# Patient Record
Sex: FEMALE | Race: WHITE | NOT HISPANIC OR LATINO | Employment: UNEMPLOYED | URBAN - METROPOLITAN AREA
[De-identification: names, ages, dates, MRNs, and addresses within clinical notes are randomized per-mention and may not be internally consistent; named-entity substitution may affect disease eponyms.]

---

## 2017-03-08 ENCOUNTER — APPOINTMENT (OUTPATIENT)
Dept: GENERAL RADIOLOGY | Facility: HOSPITAL | Age: 37
End: 2017-03-08

## 2017-03-08 ENCOUNTER — HOSPITAL ENCOUNTER (EMERGENCY)
Facility: HOSPITAL | Age: 37
Discharge: HOME | End: 2017-03-08
Attending: EMERGENCY MEDICINE | Admitting: EMERGENCY MEDICINE

## 2017-03-08 VITALS
HEART RATE: 119 BPM | DIASTOLIC BLOOD PRESSURE: 85 MMHG | OXYGEN SATURATION: 99 % | SYSTOLIC BLOOD PRESSURE: 132 MMHG | WEIGHT: 110 LBS | HEIGHT: 60 IN | TEMPERATURE: 97 F | BODY MASS INDEX: 21.6 KG/M2 | RESPIRATION RATE: 14 BRPM

## 2017-03-08 DIAGNOSIS — R61 DIAPHORESIS: Primary | ICD-10-CM

## 2017-03-08 LAB
ALBUMIN SERPL-MCNC: 4.7 G/DL (ref 3.5–5.2)
ALBUMIN/GLOB SERPL: 1.8 G/DL
ALP SERPL-CCNC: 60 U/L (ref 39–117)
ALT SERPL W P-5'-P-CCNC: 211 U/L (ref 1–33)
ANION GAP SERPL CALCULATED.3IONS-SCNC: 22.6 MMOL/L
AST SERPL-CCNC: 143 U/L (ref 1–32)
BASOPHILS # BLD AUTO: 0.02 10*3/MM3 (ref 0–0.2)
BASOPHILS NFR BLD AUTO: 0.2 % (ref 0–1.5)
BILIRUB SERPL-MCNC: 0.6 MG/DL (ref 0.1–1.2)
BILIRUB UR QL STRIP: NEGATIVE
BUN BLD-MCNC: 19 MG/DL (ref 6–20)
BUN/CREAT SERPL: 19.6 (ref 7–25)
CALCIUM SPEC-SCNC: 9.8 MG/DL (ref 8.6–10.5)
CHLORIDE SERPL-SCNC: 97 MMOL/L (ref 98–107)
CLARITY UR: CLEAR
CO2 SERPL-SCNC: 15.4 MMOL/L (ref 22–29)
COLOR UR: ABNORMAL
CREAT BLD-MCNC: 0.97 MG/DL (ref 0.57–1)
D-LACTATE SERPL-SCNC: 1.5 MMOL/L (ref 0.5–2)
DEPRECATED RDW RBC AUTO: 48 FL (ref 37–54)
EOSINOPHIL # BLD AUTO: 0.02 10*3/MM3 (ref 0–0.7)
EOSINOPHIL NFR BLD AUTO: 0.2 % (ref 0.3–6.2)
ERYTHROCYTE [DISTWIDTH] IN BLOOD BY AUTOMATED COUNT: 15.1 % (ref 11.7–13)
FLUAV AG NPH QL: NEGATIVE
FLUBV AG NPH QL IA: NEGATIVE
GFR SERPL CREATININE-BSD FRML MDRD: 65 ML/MIN/1.73
GLOBULIN UR ELPH-MCNC: 2.6 GM/DL
GLUCOSE BLD-MCNC: 75 MG/DL (ref 65–99)
GLUCOSE UR STRIP-MCNC: NEGATIVE MG/DL
HCT VFR BLD AUTO: 44 % (ref 35.6–45.5)
HGB BLD-MCNC: 15.2 G/DL (ref 11.9–15.5)
HGB UR QL STRIP.AUTO: NEGATIVE
IMM GRANULOCYTES # BLD: 0.03 10*3/MM3 (ref 0–0.03)
IMM GRANULOCYTES NFR BLD: 0.3 % (ref 0–0.5)
KETONES UR QL STRIP: ABNORMAL
LEUKOCYTE ESTERASE UR QL STRIP.AUTO: NEGATIVE
LIPASE SERPL-CCNC: 27 U/L (ref 13–60)
LYMPHOCYTES # BLD AUTO: 1.33 10*3/MM3 (ref 0.9–4.8)
LYMPHOCYTES NFR BLD AUTO: 13.6 % (ref 19.6–45.3)
MCH RBC QN AUTO: 30.3 PG (ref 26.9–32)
MCHC RBC AUTO-ENTMCNC: 34.5 G/DL (ref 32.4–36.3)
MCV RBC AUTO: 87.6 FL (ref 80.5–98.2)
MONOCYTES # BLD AUTO: 0.69 10*3/MM3 (ref 0.2–1.2)
MONOCYTES NFR BLD AUTO: 7.1 % (ref 5–12)
NEUTROPHILS # BLD AUTO: 7.66 10*3/MM3 (ref 1.9–8.1)
NEUTROPHILS NFR BLD AUTO: 78.6 % (ref 42.7–76)
NITRITE UR QL STRIP: NEGATIVE
PH UR STRIP.AUTO: 5 [PH] (ref 5–8)
PLATELET # BLD AUTO: 344 10*3/MM3 (ref 140–500)
PMV BLD AUTO: 10.5 FL (ref 6–12)
POTASSIUM BLD-SCNC: 4.6 MMOL/L (ref 3.5–5.2)
PROCALCITONIN SERPL-MCNC: 0.06 NG/ML (ref 0.1–0.25)
PROT SERPL-MCNC: 7.3 G/DL (ref 6–8.5)
PROT UR QL STRIP: NEGATIVE
RBC # BLD AUTO: 5.02 10*6/MM3 (ref 3.9–5.2)
SODIUM BLD-SCNC: 135 MMOL/L (ref 136–145)
SP GR UR STRIP: 1.03 (ref 1–1.03)
UROBILINOGEN UR QL STRIP: ABNORMAL
WBC NRBC COR # BLD: 9.75 10*3/MM3 (ref 4.5–10.7)

## 2017-03-08 PROCEDURE — 99284 EMERGENCY DEPT VISIT MOD MDM: CPT

## 2017-03-08 PROCEDURE — 87804 INFLUENZA ASSAY W/OPTIC: CPT | Performed by: PHYSICIAN ASSISTANT

## 2017-03-08 PROCEDURE — 80053 COMPREHEN METABOLIC PANEL: CPT | Performed by: PHYSICIAN ASSISTANT

## 2017-03-08 PROCEDURE — 83605 ASSAY OF LACTIC ACID: CPT | Performed by: PHYSICIAN ASSISTANT

## 2017-03-08 PROCEDURE — 83690 ASSAY OF LIPASE: CPT | Performed by: PHYSICIAN ASSISTANT

## 2017-03-08 PROCEDURE — 96360 HYDRATION IV INFUSION INIT: CPT

## 2017-03-08 PROCEDURE — 85025 COMPLETE CBC W/AUTO DIFF WBC: CPT | Performed by: PHYSICIAN ASSISTANT

## 2017-03-08 PROCEDURE — 84145 PROCALCITONIN (PCT): CPT | Performed by: PHYSICIAN ASSISTANT

## 2017-03-08 PROCEDURE — 71010 HC CHEST PA OR AP: CPT

## 2017-03-08 PROCEDURE — 96361 HYDRATE IV INFUSION ADD-ON: CPT

## 2017-03-08 PROCEDURE — 81003 URINALYSIS AUTO W/O SCOPE: CPT | Performed by: PHYSICIAN ASSISTANT

## 2017-03-08 RX ORDER — LORAZEPAM 0.5 MG/1
0.5 TABLET ORAL EVERY 8 HOURS PRN
COMMUNITY

## 2017-03-08 RX ORDER — DORZOLAMIDE HYDROCHLORIDE AND TIMOLOL MALEATE 20; 5 MG/ML; MG/ML
1 SOLUTION/ DROPS OPHTHALMIC 2 TIMES DAILY
COMMUNITY

## 2017-03-08 RX ORDER — MELATONIN
2000 DAILY
COMMUNITY

## 2017-03-08 RX ORDER — RISEDRONATE SODIUM 35 MG/1
35 TABLET, FILM COATED ORAL
COMMUNITY

## 2017-03-08 RX ORDER — FEXOFENADINE HCL 180 MG/1
180 TABLET ORAL DAILY
COMMUNITY

## 2017-03-08 RX ORDER — GABAPENTIN 600 MG/1
600 TABLET ORAL 2 TIMES DAILY
COMMUNITY

## 2017-03-08 RX ORDER — LEVONORGESTREL AND ETHINYL ESTRADIOL 0.15-0.03
1 KIT ORAL DAILY
COMMUNITY

## 2017-03-08 RX ORDER — TIMOLOL MALEATE 5 MG/ML
1 SOLUTION/ DROPS OPHTHALMIC 2 TIMES DAILY
COMMUNITY

## 2017-03-08 RX ORDER — LATANOPROST 50 UG/ML
1 SOLUTION/ DROPS OPHTHALMIC NIGHTLY
COMMUNITY

## 2017-03-08 RX ORDER — SODIUM CHLORIDE 0.9 % (FLUSH) 0.9 %
10 SYRINGE (ML) INJECTION AS NEEDED
Status: DISCONTINUED | OUTPATIENT
Start: 2017-03-08 | End: 2017-03-08 | Stop reason: HOSPADM

## 2017-03-08 RX ADMIN — SODIUM CHLORIDE 500 ML: 9 INJECTION, SOLUTION INTRAVENOUS at 12:04

## 2017-03-08 NOTE — ED NOTES
"Pt arrived to ER with mother. Pt mother stated \"shes been sweating since Monday and her stomach hurts\". Pt saw pcp today. Pt family is complaining of \"this is not her\". MD at bedside. Pt has stated \"she is not eating\". Denied vomiting, diarrhea,no fevers.     Jena Bañuelos RN  03/08/17 1130       Jena Bañuelos RN  03/08/17 1131    "

## 2017-03-08 NOTE — ED NOTES
Patient was at Dr Gonzalez's office today and sent over for AMS.     Inés Camejo RN  03/08/17 0900

## 2017-03-08 NOTE — ED NOTES
Pt discharged with discharge instructions and follow up appointment suggested. Pt did not allow last set of vitals to be taken. Pt going how with mother. Pt alert and oriented x4, ambulatory, pt breathing room air. Pt and family had no questions for this RN.     Jena Bañuelos RN  03/08/17 4244

## 2017-03-08 NOTE — ED PROVIDER NOTES
" EMERGENCY DEPARTMENT ENCOUNTER    CHIEF COMPLAINT  Chief Complaint: AMS  History given by: Family  History limited by: Cooperation, mental capabilities   Room Number: 02/02  PMD: Aden Gonzalez Jr., MD      HPI:  Pt is a 36 y.o. female, h/o Bipolar disorder, presents to the ED with AMS for the past 3 days. Family at bedside give the history due to the patient's lack of cooperation. The patient has been c/o intermittent abdominal pain for the past three days with associated episodes of dry heaving and diaphoresis since onset. She has also been holding her right ear since onset but has not specifically said her ear hurts. The patient was evaluated by her PCP this morning although was was sent to the ED for further evaluation because she was in a manic state. Family explain that the patient would not allow her blood to be drawn at her PCP's office. The patient repeatedly states \"I want to go home\". Family explain that she is normally calm and cooperative. History is limited due to the patient's intellectual disability and cooperation.     Duration:  3 days  Onset: Gradual  Timing: Constant  Location: Generalized  Intensity/Severity: Moderate  Progression: No Change  Associated Symptoms: AMS, dry heaving, abdominal pain, diaphoresis  Aggravating Factors: Unknown  Alleviating Factors: None  Previous Episodes: None mentioned  Treatment before arrival: None    PAST MEDICAL HISTORY  Active Ambulatory Problems     Diagnosis Date Noted   • No Active Ambulatory Problems     Resolved Ambulatory Problems     Diagnosis Date Noted   • No Resolved Ambulatory Problems     Past Medical History   Diagnosis Date   • Bipolar 1 disorder    • Detached retina    • Diabetes mellitus    • Glaucoma    • Hyperlipidemia    • Schizophrenia        PAST SURGICAL HISTORY  Past Surgical History   Procedure Laterality Date   • Cataract extraction         FAMILY HISTORY  History reviewed. No pertinent family history.    SOCIAL HISTORY  Social " History     Social History   • Marital status: Single     Spouse name: N/A   • Number of children: N/A   • Years of education: N/A     Occupational History   • Not on file.     Social History Main Topics   • Smoking status: Never Smoker   • Smokeless tobacco: Not on file   • Alcohol use No   • Drug use: No   • Sexual activity: Defer     Other Topics Concern   • Not on file     Social History Narrative   • No narrative on file       ALLERGIES  Penicillins    REVIEW OF SYSTEMS  Review of Systems   Unable to perform ROS: Other (Cooperation)       PHYSICAL EXAM  ED Triage Vitals   Temp Heart Rate Resp BP SpO2   03/08/17 0945 03/08/17 0945 03/08/17 0945 03/08/17 1041 03/08/17 0945   97 °F (36.1 °C) 131 14 116/86 100 %      Temp src Heart Rate Source Patient Position BP Location FiO2 (%)   03/08/17 0945 03/08/17 0945 -- -- --   Tympanic Monitor          Physical Exam   Constitutional: She is well-developed, well-nourished, and in no distress.   Limited by cooperation   HENT:   Head: Normocephalic.   Right Ear: Tympanic membrane normal.   Left Ear: Tympanic membrane normal.   Eyes: Pupils are equal, round, and reactive to light.   Neck: Normal range of motion.   Cardiovascular: Normal rate and regular rhythm.    Pulmonary/Chest: Effort normal and breath sounds normal.   Abdominal: Soft.   Musculoskeletal: Normal range of motion.   Neurological: She is alert.   Skin: Skin is warm and dry.   Psychiatric: Her mood appears anxious.       LAB RESULTS  Lab Results (last 24 hours)     Procedure Component Value Units Date/Time    CBC & Differential [[id number]] Collected:  03/08/17 1153    Specimen:  Blood Updated:  03/08/17 1211    Narrative:       The following orders were created for panel order CBC & Differential.  Procedure                               Abnormality         Status                     ---------                               -----------         ------                     CBC Auto Differential[[id number]]          "Abnormal            Final result                 Please view results for these tests on the individual orders.    Comprehensive Metabolic Panel [[id number]]  (Abnormal) Collected:  03/08/17 1153    Specimen:  Blood Updated:  03/08/17 1305     Glucose 75 mg/dL      BUN 19 mg/dL      Creatinine 0.97 mg/dL      Sodium 135 (L) mmol/L      Potassium 4.6 mmol/L      Chloride 97 (L) mmol/L      CO2 15.4 (L) mmol/L      Calcium 9.8 mg/dL      Total Protein 7.3 g/dL      Albumin 4.70 g/dL      ALT (SGPT) 211 (H) U/L      AST (SGOT) 143 (H) U/L      Alkaline Phosphatase 60 U/L      Total Bilirubin 0.6 mg/dL      eGFR Non African Amer 65 mL/min/1.73      Globulin 2.6 gm/dL      A/G Ratio 1.8 g/dL      BUN/Creatinine Ratio 19.6      Anion Gap 22.6 mmol/L     Lactic Acid, Plasma [[id number]]  (Normal) Collected:  03/08/17 1153    Specimen:  Blood Updated:  03/08/17 1228     Lactate 1.5 mmol/L     Procalcitonin [[id number]]  (Abnormal) Collected:  03/08/17 1153    Specimen:  Blood Updated:  03/08/17 1256     Procalcitonin 0.06 (L) ng/mL     Narrative:       As a Marker for Sepsis (Non-Neonates):   1. <0.5 ng/mL represents a low risk of severe sepsis and/or septic shock.  1. >2 ng/mL represents a high risk of severe sepsis and/or septic shock.    As a Marker for Lower Respiratory Tract Infections that require antibiotic therapy:  PCT on Admission     Antibiotic Therapy             6-12 Hrs later  > 0.5                Strongly Recommended            >0.25 - <0.5         Recommended  0.1 - 0.25           Discouraged                   Remeasure/reassess PCT  <0.1                 Strongly Discouraged          Remeasure/reassess PCT      As 28 day mortality risk marker: \"Change in Procalcitonin Result\" (> 80 % or <=80 %) if Day 0 (or Day 1) and Day 4 values are available. Refer to http://www.Self Points-pct-calculator.com/   Change in PCT <=80 %   A decrease of PCT levels below or equal to 80 % defines a positive change in PCT test result " representing a higher risk for 28-day all-cause mortality of patients diagnosed with severe sepsis or septic shock.  Change in PCT > 80 %   A decrease of PCT levels of more than 80 % defines a negative change in PCT result representing a lower risk for 28-day all-cause mortality of patients diagnosed with severe sepsis or septic shock.                Lipase [[id number]]  (Normal) Collected:  03/08/17 1153    Specimen:  Blood Updated:  03/08/17 1235     Lipase 27 U/L     CBC Auto Differential [[id number]]  (Abnormal) Collected:  03/08/17 1153    Specimen:  Blood Updated:  03/08/17 1211     WBC 9.75 10*3/mm3      RBC 5.02 10*6/mm3      Hemoglobin 15.2 g/dL      Hematocrit 44.0 %      MCV 87.6 fL      MCH 30.3 pg      MCHC 34.5 g/dL      RDW 15.1 (H) %      RDW-SD 48.0 fl      MPV 10.5 fL      Platelets 344 10*3/mm3      Neutrophil % 78.6 (H) %      Lymphocyte % 13.6 (L) %      Monocyte % 7.1 %      Eosinophil % 0.2 (L) %      Basophil % 0.2 %      Immature Grans % 0.3 %      Neutrophils, Absolute 7.66 10*3/mm3      Lymphocytes, Absolute 1.33 10*3/mm3      Monocytes, Absolute 0.69 10*3/mm3      Eosinophils, Absolute 0.02 10*3/mm3      Basophils, Absolute 0.02 10*3/mm3      Immature Grans, Absolute 0.03 10*3/mm3     Influenza Antigen [[id number]]  (Normal) Collected:  03/08/17 1155    Specimen:  Swab from Nasopharynx Updated:  03/08/17 1248     Influenza A Ag, EIA Negative      Influenza B Ag, EIA Negative     Urinalysis With / Culture If Indicated [[id number]]  (Abnormal) Collected:  03/08/17 1200    Specimen:  Urine from Urine, Catheter Updated:  03/08/17 1217     Color, UA Dark Yellow (A)      Appearance, UA Clear      pH, UA 5.0      Specific Gravity, UA 1.027      Glucose, UA Negative      Ketones, UA 40 mg/dL (2+) (A)      Bilirubin, UA Negative      Blood, UA Negative      Protein, UA Negative      Leuk Esterase, UA Negative      Nitrite, UA Negative      Urobilinogen, UA 0.2 E.U./dL     Narrative:       Urine  "microscopic not indicated.          I ordered the above labs and reviewed the results    RADIOLOGY  XR Chest 1 View   Preliminary Result   No active disease in the chest.             I ordered the above noted radiological studies. Interpreted by radiologist. Reviewed by me in PACS.       PROCEDURES  Procedures      PROGRESS AND CONSULTS  ED Course     11:39  Ordered Labs and CXR for further evaluation. Also ordered IVF for hydration.     13:30  Discussed case with  and he agrees with the treatment plan. He would like me to consult the patient's PCP.     13:36  Discussed case with Dr Gonzalez (PCP). Reviewed history, exam, results and treatments.  Discussed concerns and plan of care. He would like me to give her fluids and have the patient follow up in the office in a few days.     14:14  Rechecked patient and they are resting and still repeating \"I want to go home\". Discussed pertinent lab and imaging results, including CXR shows nothing acute and her liver enzymes are slightly elevated. Discussed the plan to discharge the patient home to prevent further stress. Patient agrees with plan and all questions were addressed.     Latest vital signs   BP- 132/85 HR- 119 Temp- 97 °F (36.1 °C) (Tympanic) O2 sat- 100%      MEDICAL DECISION MAKING  Results were reviewed/discussed with the patient and they were also made aware of online access. Pt also made aware that some labs, such as cultures, will not be resulted during ER visit and follow up with PMD is necessary.     MDM  Number of Diagnoses or Management Options  Diaphoresis:   Diagnosis management comments: Had detailed discussion with family.  Discussed lab results and discussion with Carlos.  I believe pt is safe to go home and will do better there.  She is nontoxic appearing.  I offered admission, however the family and pt agree to monitor at home and have close follow-up.         Amount and/or Complexity of Data Reviewed  Clinical lab tests: ordered and " reviewed  Tests in the radiology section of CPT®: ordered and reviewed    Patient Progress  Patient progress: stable         DIAGNOSIS  Final diagnoses:   Diaphoresis       DISPOSITION  DISCHARGE    Patient discharged in stable condition.    Reviewed implications of results, diagnosis, meds, responsibility to follow up, warning signs and symptoms of possible worsening, potential complications and reasons to return to ER, including worsening abdominal pain or anymore manic episodes.     Patient/Family voiced understanding of above instructions.    Discussed plan for discharge, as there is no emergent indication for admission.  Pt/family is agreeable and understands need for follow up and repeat testing.  Pt is aware that discharge does not mean that nothing is wrong but it indicates no emergency is present that requires admission and they must continue care with follow-up as given below or physician of their choice.     FOLLOW-UP  Aden Gonzalez Jr., MD  [address] [id number] [phone]    In 2 days  if no improvement         Medication List      Notice     No changes were made to your prescriptions during this visit.          Latest Documented Vital Signs:  As of 2:51 PM  BP- 132/85 HR- 119 Temp- 97 °F (36.1 °C) (Tympanic) O2 sat- 99%    --  Documentation assistance provided by dodie Maldonado for López Jordan PA-C.  Information recorded by the scribe was done at my direction and has been verified and validated by me.       Rocco Maldonado  03/08/17 3887       MINGO Crowe  03/08/17 0539

## 2017-03-08 NOTE — ED PROVIDER NOTES
I supervised care provided by the midlevel provider.    We have discussed this patient's history, physical exam, and treatment plan.   I have reviewed the note and personally saw and examined the patient and agree with the plan of care.    Pt presents to the ED with altered mental status in which pt has been more agitated from baseline. Pt has not had vomiting or diarrhea, but has had nausea, decreased PO intake, excessive sweating, and dry heaves. Pt was seen for this by her PMD today and was referred to the ER for further evaluation. On physical exam, pt is awake. Pt is tachycardic. Lungs are CTAB. Abdomen is soft. Pt's flu screen is negative. UA shows that there are ketones present. Labs suggest that pt is dehydrated. Family was offered admission for pt to the hospitalist; however, family declines at this time as they do not want to prevent further stress to pt. Pt has received IV fluids in the ER and case was d/w Dr. Gonzalez, PMD, who will f/u with pt in the office.             Documentation assistance provided by Katlyn Fernandes. Information recorded by the scribe was done at my direction and has been verified and validated by me.     Entered by Katlyn Fernandes, acting as scribe for Dr. Mayuri MD.                Katlyn Fernandes  03/08/17 1446       Terrence Messina MD  03/08/17 2011